# Patient Record
Sex: FEMALE | Race: WHITE | NOT HISPANIC OR LATINO | Employment: FULL TIME | URBAN - NONMETROPOLITAN AREA
[De-identification: names, ages, dates, MRNs, and addresses within clinical notes are randomized per-mention and may not be internally consistent; named-entity substitution may affect disease eponyms.]

---

## 2017-01-30 ENCOUNTER — OFFICE VISIT (OUTPATIENT)
Dept: RETAIL CLINIC | Facility: CLINIC | Age: 32
End: 2017-01-30

## 2017-01-30 VITALS
HEART RATE: 89 BPM | WEIGHT: 108 LBS | RESPIRATION RATE: 20 BRPM | BODY MASS INDEX: 19.88 KG/M2 | DIASTOLIC BLOOD PRESSURE: 50 MMHG | SYSTOLIC BLOOD PRESSURE: 100 MMHG | HEIGHT: 62 IN | OXYGEN SATURATION: 99 % | TEMPERATURE: 98.7 F

## 2017-01-30 DIAGNOSIS — J10.1 INFLUENZA A: Primary | ICD-10-CM

## 2017-01-30 LAB
EXPIRATION DATE: ABNORMAL
FLUAV AG NPH QL: ABNORMAL
FLUBV AG NPH QL: ABNORMAL
INTERNAL CONTROL: ABNORMAL
Lab: ABNORMAL

## 2017-01-30 PROCEDURE — 99213 OFFICE O/P EST LOW 20 MIN: CPT | Performed by: NURSE PRACTITIONER

## 2017-01-30 PROCEDURE — 87804 INFLUENZA ASSAY W/OPTIC: CPT | Performed by: NURSE PRACTITIONER

## 2017-01-30 RX ORDER — OSELTAMIVIR PHOSPHATE 75 MG/1
75 CAPSULE ORAL 2 TIMES DAILY
Qty: 10 CAPSULE | Refills: 0 | Status: SHIPPED | OUTPATIENT
Start: 2017-01-30 | End: 2018-10-22

## 2017-01-30 NOTE — PROGRESS NOTES
Subjective   Kym Pepe is a 31 y.o. female who presents to the clinic with: fever and cough      Fever    This is a new problem. The current episode started yesterday. The problem occurs intermittently. The problem has been gradually worsening. The maximum temperature noted was 100 to 100.9 F. Temperature source: infrared temp. Associated symptoms include coughing, muscle aches and sleepiness. Pertinent negatives include no abdominal pain, chest pain, congestion, diarrhea, ear pain, headaches, nausea, rash, sore throat, urinary pain, vomiting or wheezing. Associated symptoms comments: Shortness of breath;Sneezing. She has tried NSAIDs for the symptoms. Improvement on treatment: temp went down and myalgias got better.   Risk factors: sick contacts    Risk factors: no recent sickness    Cough   This is a new problem. The current episode started yesterday. The problem has been unchanged. The problem occurs constantly. The cough is non-productive. Associated symptoms include ear congestion, a fever, myalgias, nasal congestion, postnasal drip, rhinorrhea and shortness of breath. Pertinent negatives include no chest pain, chills, ear pain, headaches, heartburn, hemoptysis, rash, sore throat, sweats, weight loss or wheezing. Nothing aggravates the symptoms. She has tried OTC cough suppressant for the symptoms. The treatment provided mild relief. Her past medical history is significant for environmental allergies. There is no history of asthma, bronchitis or pneumonia.        The following portions of the patient's history were reviewed and updated as appropriate: allergies, current medications, past family history, past medical history, past social history, past surgical history and problem list.        Review of Systems   Constitutional: Positive for fever. Negative for chills, diaphoresis and weight loss.   HENT: Positive for postnasal drip and rhinorrhea. Negative for congestion, ear pain and sore throat.     Respiratory: Positive for cough and shortness of breath. Negative for hemoptysis and wheezing.    Cardiovascular: Negative for chest pain.   Gastrointestinal: Negative for abdominal pain, diarrhea, heartburn, nausea and vomiting.   Genitourinary: Negative for dysuria.   Musculoskeletal: Positive for myalgias.   Skin: Negative for rash.   Allergic/Immunologic: Positive for environmental allergies.   Neurological: Negative for headaches.   All other systems reviewed and are negative.        Objective   Physical Exam   Constitutional: She is oriented to person, place, and time. Vital signs are normal. She appears well-developed and well-nourished.   HENT:   Head: Normocephalic and atraumatic.   Right Ear: Hearing, external ear and ear canal normal.   Left Ear: Hearing, external ear and ear canal normal.   Nose: Nose normal. Right sinus exhibits no maxillary sinus tenderness and no frontal sinus tenderness. Left sinus exhibits no maxillary sinus tenderness and no frontal sinus tenderness.   Mouth/Throat: Uvula is midline and oropharynx is clear and moist.   Rick tms dull and cloudy with scattered light reflex   Eyes: Conjunctivae and lids are normal. Pupils are equal, round, and reactive to light.   Neck: Neck supple. No tracheal deviation present. No thyromegaly present.   Cardiovascular: Normal rate, regular rhythm, S1 normal, S2 normal and normal heart sounds.  Exam reveals no gallop, no S3, no S4 and no friction rub.    No murmur heard.  Pulmonary/Chest: Effort normal. She has decreased breath sounds.   Breath sounds decreased    Lymphadenopathy:     She has no cervical adenopathy.   Neurological: She is alert and oriented to person, place, and time.   Skin: Skin is warm, dry and intact.   Psychiatric: She has a normal mood and affect. Her behavior is normal.   Vitals reviewed.        Assessment/Plan   Kym was seen today for fever and cough.    Diagnoses and all orders for this visit:    Influenza A  -      oseltamivir (TAMIFLU) 75 MG capsule; Take 1 capsule by mouth 2 (Two) Times a Day.  -     POC Influenza A / B

## 2017-01-30 NOTE — PATIENT INSTRUCTIONS
Pt advised she has influenza a.  Tamiflu script for her and her family.  Rest, drink plenty of fluids, off work for 5 days and until fever free. Treat s/s with otc meds.  If she develops severe shortness, of breath chest pain, fever uncontrolled or cough go to ER.

## 2017-02-06 ENCOUNTER — TELEPHONE (OUTPATIENT)
Dept: RETAIL CLINIC | Facility: CLINIC | Age: 32
End: 2017-02-06

## 2017-02-06 NOTE — TELEPHONE ENCOUNTER
I advised in light of testing positive for flu and just coming off tamiflu she could have a complication from the flu.  She needs to go to ER for evaluation.  Pt verbalized understanding.

## 2017-10-26 ENCOUNTER — OFFICE VISIT (OUTPATIENT)
Dept: RETAIL CLINIC | Facility: CLINIC | Age: 32
End: 2017-10-26

## 2017-10-26 VITALS — TEMPERATURE: 98.2 F

## 2017-10-26 DIAGNOSIS — Z23 NEED FOR IMMUNIZATION AGAINST INFLUENZA: Primary | ICD-10-CM

## 2017-10-26 NOTE — PROGRESS NOTES
Kym Pepe is a 32 y.o. female who presents to the clinic for a flu shot. No fever or illness today. No contraindications for flu vaccine. Kym filled out questionnaire and signed consent.    History of Present Illness No illness today.  Flu shot only.    The following portions of the patient's history were reviewed and updated as appropriate: allergies, current medications, past family history, past medical history, past social history, past surgical history and problem list.        Review of Systems   All other systems reviewed and are negative.        Objective   Physical Exam   Constitutional: She is oriented to person, place, and time. She appears well-developed and well-nourished.   HENT:   Head: Normocephalic and atraumatic.   Eyes: Pupils are equal, round, and reactive to light.   Neck: Neck supple.   Neurological: She is alert and oriented to person, place, and time.   Skin: Skin is warm, dry and intact.   Psychiatric: She has a normal mood and affect. Her behavior is normal. Judgment and thought content normal.         Assessment/Plan   Influenza vaccination        Forms completed. No restrictions. See immunizations and vaxcare forms.  Fluzone given.  Information given on flu shot. Follow up yrly.     Follow up as needed

## 2017-10-26 NOTE — PATIENT INSTRUCTIONS
Forms completed. No restrictions. See immunizations and vaxcare forms.  Fluzone given.  Information given on flu shot. Follow up yrly.

## 2018-10-22 ENCOUNTER — OFFICE VISIT (OUTPATIENT)
Dept: RETAIL CLINIC | Facility: CLINIC | Age: 33
End: 2018-10-22

## 2018-10-22 VITALS
DIASTOLIC BLOOD PRESSURE: 70 MMHG | TEMPERATURE: 98 F | RESPIRATION RATE: 20 BRPM | HEART RATE: 89 BPM | WEIGHT: 108 LBS | OXYGEN SATURATION: 97 % | HEIGHT: 62 IN | BODY MASS INDEX: 19.88 KG/M2 | SYSTOLIC BLOOD PRESSURE: 110 MMHG

## 2018-10-22 DIAGNOSIS — M43.6 TORTICOLLIS, ACUTE: Primary | ICD-10-CM

## 2018-10-22 PROCEDURE — 99213 OFFICE O/P EST LOW 20 MIN: CPT | Performed by: NURSE PRACTITIONER

## 2018-10-22 PROCEDURE — 96372 THER/PROPH/DIAG INJ SC/IM: CPT | Performed by: NURSE PRACTITIONER

## 2018-10-22 RX ORDER — PREDNISONE 20 MG/1
20 TABLET ORAL DAILY
Qty: 20 TABLET | Refills: 0 | OUTPATIENT
Start: 2018-10-22 | End: 2019-01-15 | Stop reason: HOSPADM

## 2018-10-22 RX ORDER — DEXAMETHASONE SODIUM PHOSPHATE 4 MG/ML
8 INJECTION, SOLUTION INTRA-ARTICULAR; INTRALESIONAL; INTRAMUSCULAR; INTRAVENOUS; SOFT TISSUE ONCE
Status: COMPLETED | OUTPATIENT
Start: 2018-10-22 | End: 2018-10-22

## 2018-10-22 RX ADMIN — DEXAMETHASONE SODIUM PHOSPHATE 8 MG: 4 INJECTION, SOLUTION INTRA-ARTICULAR; INTRALESIONAL; INTRAMUSCULAR; INTRAVENOUS; SOFT TISSUE at 10:10

## 2018-10-22 NOTE — PROGRESS NOTES
Subjective   Kym Pepe is a 33 y.o. female who presents to the clinic with: back pain      Back Pain   This is a new problem. The current episode started today. The problem occurs constantly. The problem is unchanged. The pain is present in the thoracic spine. The quality of the pain is described as aching, shooting and stabbing. Radiates to: radiates to neck. The pain is at a severity of 7/10. The pain is moderate. The pain is the same all the time. The symptoms are aggravated by twisting, position and bending. Stiffness is present all day. Associated symptoms include headaches. Pertinent negatives include no fever, numbness, paresis, paresthesias, tingling or weakness. She has tried NSAIDs (icy hot patches) for the symptoms. The treatment provided mild relief.        The following portions of the patient's history were reviewed and updated as appropriate: allergies, current medications, past family history, past medical history, past social history, past surgical history and problem list.        Review of Systems   Constitutional: Negative for fever.   Musculoskeletal: Positive for back pain and neck pain. Negative for neck stiffness.   Neurological: Positive for headaches. Negative for tingling, weakness, numbness and paresthesias.         Objective   Physical Exam   Constitutional: She is oriented to person, place, and time. Vital signs are normal. She appears well-developed and well-nourished.   HENT:   Head: Normocephalic and atraumatic.   Eyes: Pupils are equal, round, and reactive to light. Conjunctivae are normal.   Neck: Neck supple. No tracheal deviation present.   Neck range of motion limited related to pain in neck and thoraic spine.  Pt can flex but not extend or laterally bend neck to the left or right   Musculoskeletal: She exhibits tenderness. She exhibits no edema or deformity.        Cervical back: She exhibits decreased range of motion, tenderness and pain. She exhibits no bony tenderness,  no swelling, no edema, no deformity, no laceration, no spasm and normal pulse.        Thoracic back: She exhibits decreased range of motion, tenderness and pain. She exhibits no bony tenderness, no swelling, no edema, no deformity, no laceration, no spasm and normal pulse.   Pt denies pain with palpation of upper cervical but does have some pain low cervical and upper thoraic spine but no pain thoraic or lumbar spine.  Pt is able to internally and externally  rotate the arms behind head and back but with some limit and pain.  Pt can not raise area over head completely approximately 1/2 way.     Lymphadenopathy:     She has no cervical adenopathy.   Neurological: She is alert and oriented to person, place, and time. She has normal strength. No sensory deficit. Coordination and gait normal.   Reflex Scores:       Bicep reflexes are 2+ on the right side and 2+ on the left side.       Brachioradialis reflexes are 2+ on the right side and 2+ on the left side.       Patellar reflexes are 2+ on the right side and 2+ on the left side.  Skin: Skin is warm, dry and intact.   Psychiatric: She has a normal mood and affect. Her speech is normal and behavior is normal. Judgment and thought content normal.   Vitals reviewed.        Assessment/Plan   Kym was seen today for back pain.    Diagnoses and all orders for this visit:    Torticollis, acute    Other orders  -     predniSONE (DELTASONE) 20 MG tablet; Take 1 tablet by mouth Daily. 1 tid x 3 days the 1 bid x 3 days then 1 dly x 3 days then 1/2 dly x 4 days stop          Pt advised she seems to have a torticollis.  Continue icy patch and Ibuprofen today.  Stop ibuprofen in am and start Prednisone. After prednisone she can resume the ibuprofen 600 every 6 hrs for 2 wks.  Ice and heat alternating 3 to 4 times a day.  If she worsens or s/s change with headache, fever, weakness of arms go to ER or if you do not improve in 2 days follow up with pcp

## 2018-10-22 NOTE — PATIENT INSTRUCTIONS
Pt advised she seems to have a torticollis.  Continue icy patch and Ibuprofen today.  Stop ibuprofen in am and start Prednisone. After prednisone she can resume the ibuprofen 600 every 6 hrs for 2 wks.  Ice and heat alternating 3 to 4 times a day.  If she worsens or s/s change with headache, fever, weakness of arms go to ER or if you do not improve in 2 days follow up with pcp

## 2019-01-14 ENCOUNTER — TRANSCRIBE ORDERS (OUTPATIENT)
Dept: ADMINISTRATIVE | Facility: HOSPITAL | Age: 34
End: 2019-01-14

## 2019-01-14 DIAGNOSIS — R31.9 HEMATURIA, UNSPECIFIED TYPE: Primary | ICD-10-CM

## 2019-01-15 ENCOUNTER — APPOINTMENT (OUTPATIENT)
Dept: CT IMAGING | Facility: HOSPITAL | Age: 34
End: 2019-01-15

## 2019-01-15 ENCOUNTER — HOSPITAL ENCOUNTER (EMERGENCY)
Facility: HOSPITAL | Age: 34
Discharge: HOME | End: 2019-01-15
Admitting: EMERGENCY MEDICINE

## 2019-01-15 VITALS
HEIGHT: 62 IN | DIASTOLIC BLOOD PRESSURE: 71 MMHG | TEMPERATURE: 100 F | HEART RATE: 92 BPM | SYSTOLIC BLOOD PRESSURE: 105 MMHG | OXYGEN SATURATION: 100 % | RESPIRATION RATE: 16 BRPM | BODY MASS INDEX: 21.53 KG/M2 | WEIGHT: 117 LBS

## 2019-01-15 DIAGNOSIS — N20.0 KIDNEY STONE ON LEFT SIDE: Primary | ICD-10-CM

## 2019-01-15 DIAGNOSIS — N83.201 RIGHT OVARIAN CYST: ICD-10-CM

## 2019-01-15 LAB
ALBUMIN SERPL-MCNC: 4.3 G/DL (ref 3.5–5)
ALBUMIN/GLOB SERPL: 1.5 G/DL (ref 1.1–2.5)
ALP SERPL-CCNC: 44 U/L (ref 24–120)
ALT SERPL W P-5'-P-CCNC: 25 U/L (ref 0–54)
AMYLASE SERPL-CCNC: 79 U/L (ref 30–110)
ANION GAP SERPL CALCULATED.3IONS-SCNC: 10 MMOL/L (ref 4–13)
AST SERPL-CCNC: 23 U/L (ref 7–45)
B-HCG UR QL: NEGATIVE
BACTERIA UR QL AUTO: ABNORMAL /HPF
BASOPHILS # BLD AUTO: 0.05 10*3/MM3 (ref 0–0.2)
BASOPHILS NFR BLD AUTO: 0.5 % (ref 0–2)
BILIRUB SERPL-MCNC: 0.6 MG/DL (ref 0.1–1)
BILIRUB UR QL STRIP: NEGATIVE
BUN BLD-MCNC: 14 MG/DL (ref 5–21)
BUN/CREAT SERPL: 15.6 (ref 7–25)
CALCIUM SPEC-SCNC: 8.8 MG/DL (ref 8.4–10.4)
CHLORIDE SERPL-SCNC: 102 MMOL/L (ref 98–110)
CLARITY UR: ABNORMAL
CO2 SERPL-SCNC: 28 MMOL/L (ref 24–31)
COLOR UR: YELLOW
CREAT BLD-MCNC: 0.9 MG/DL (ref 0.5–1.4)
DEPRECATED RDW RBC AUTO: 36.3 FL (ref 40–54)
EOSINOPHIL # BLD AUTO: 0.06 10*3/MM3 (ref 0–0.7)
EOSINOPHIL NFR BLD AUTO: 0.7 % (ref 0–4)
ERYTHROCYTE [DISTWIDTH] IN BLOOD BY AUTOMATED COUNT: 11.5 % (ref 12–15)
GFR SERPL CREATININE-BSD FRML MDRD: 72 ML/MIN/1.73
GLOBULIN UR ELPH-MCNC: 2.8 GM/DL
GLUCOSE BLD-MCNC: 129 MG/DL (ref 70–100)
GLUCOSE UR STRIP-MCNC: NEGATIVE MG/DL
HCT VFR BLD AUTO: 39.3 % (ref 37–47)
HGB BLD-MCNC: 13.9 G/DL (ref 12–16)
HGB UR QL STRIP.AUTO: ABNORMAL
HOLD SPECIMEN: NORMAL
HOLD SPECIMEN: NORMAL
IMM GRANULOCYTES # BLD AUTO: 0.04 10*3/MM3 (ref 0–0.03)
IMM GRANULOCYTES NFR BLD AUTO: 0.4 % (ref 0–5)
KETONES UR QL STRIP: NEGATIVE
LEUKOCYTE ESTERASE UR QL STRIP.AUTO: ABNORMAL
LIPASE SERPL-CCNC: 69 U/L (ref 23–203)
LYMPHOCYTES # BLD AUTO: 1.67 10*3/MM3 (ref 0.72–4.86)
LYMPHOCYTES NFR BLD AUTO: 18.2 % (ref 15–45)
MCH RBC QN AUTO: 30.8 PG (ref 28–32)
MCHC RBC AUTO-ENTMCNC: 35.4 G/DL (ref 33–36)
MCV RBC AUTO: 86.9 FL (ref 82–98)
MONOCYTES # BLD AUTO: 0.37 10*3/MM3 (ref 0.19–1.3)
MONOCYTES NFR BLD AUTO: 4 % (ref 4–12)
NEUTROPHILS # BLD AUTO: 7.01 10*3/MM3 (ref 1.87–8.4)
NEUTROPHILS NFR BLD AUTO: 76.2 % (ref 39–78)
NITRITE UR QL STRIP: NEGATIVE
NRBC BLD AUTO-RTO: 0 /100 WBC (ref 0–0)
PH UR STRIP.AUTO: 7.5 [PH] (ref 5–8)
PLATELET # BLD AUTO: 187 10*3/MM3 (ref 130–400)
PMV BLD AUTO: 10.7 FL (ref 6–12)
POTASSIUM BLD-SCNC: 4.2 MMOL/L (ref 3.5–5.3)
PROT SERPL-MCNC: 7.1 G/DL (ref 6.3–8.7)
PROT UR QL STRIP: ABNORMAL
RBC # BLD AUTO: 4.52 10*6/MM3 (ref 4.2–5.4)
RBC # UR: ABNORMAL /HPF
REF LAB TEST METHOD: ABNORMAL
SODIUM BLD-SCNC: 140 MMOL/L (ref 135–145)
SP GR UR STRIP: 1.03 (ref 1–1.03)
SQUAMOUS #/AREA URNS HPF: ABNORMAL /HPF
UROBILINOGEN UR QL STRIP: ABNORMAL
WBC NRBC COR # BLD: 9.2 10*3/MM3 (ref 4.8–10.8)
WBC UR QL AUTO: ABNORMAL /HPF
WHOLE BLOOD HOLD SPECIMEN: NORMAL
WHOLE BLOOD HOLD SPECIMEN: NORMAL

## 2019-01-15 PROCEDURE — 82150 ASSAY OF AMYLASE: CPT | Performed by: EMERGENCY MEDICINE

## 2019-01-15 PROCEDURE — 74177 CT ABD & PELVIS W/CONTRAST: CPT

## 2019-01-15 PROCEDURE — 87086 URINE CULTURE/COLONY COUNT: CPT | Performed by: EMERGENCY MEDICINE

## 2019-01-15 PROCEDURE — 25010000002 HYDROMORPHONE PER 4 MG: Performed by: EMERGENCY MEDICINE

## 2019-01-15 PROCEDURE — 25010000002 MORPHINE PER 10 MG: Performed by: EMERGENCY MEDICINE

## 2019-01-15 PROCEDURE — 96375 TX/PRO/DX INJ NEW DRUG ADDON: CPT

## 2019-01-15 PROCEDURE — 25010000002 PROMETHAZINE PER 50 MG: Performed by: EMERGENCY MEDICINE

## 2019-01-15 PROCEDURE — 81001 URINALYSIS AUTO W/SCOPE: CPT | Performed by: EMERGENCY MEDICINE

## 2019-01-15 PROCEDURE — 96374 THER/PROPH/DIAG INJ IV PUSH: CPT

## 2019-01-15 PROCEDURE — 36415 COLL VENOUS BLD VENIPUNCTURE: CPT | Performed by: PHYSICIAN ASSISTANT

## 2019-01-15 PROCEDURE — 0 IOPAMIDOL PER 1 ML: Performed by: PHYSICIAN ASSISTANT

## 2019-01-15 PROCEDURE — 85025 COMPLETE CBC W/AUTO DIFF WBC: CPT | Performed by: EMERGENCY MEDICINE

## 2019-01-15 PROCEDURE — 25010000002 KETOROLAC TROMETHAMINE PER 15 MG: Performed by: EMERGENCY MEDICINE

## 2019-01-15 PROCEDURE — 99283 EMERGENCY DEPT VISIT LOW MDM: CPT

## 2019-01-15 PROCEDURE — 87040 BLOOD CULTURE FOR BACTERIA: CPT | Performed by: EMERGENCY MEDICINE

## 2019-01-15 PROCEDURE — 25010000002 ONDANSETRON PER 1 MG: Performed by: EMERGENCY MEDICINE

## 2019-01-15 PROCEDURE — 81025 URINE PREGNANCY TEST: CPT | Performed by: EMERGENCY MEDICINE

## 2019-01-15 PROCEDURE — 96361 HYDRATE IV INFUSION ADD-ON: CPT

## 2019-01-15 PROCEDURE — 83690 ASSAY OF LIPASE: CPT | Performed by: EMERGENCY MEDICINE

## 2019-01-15 PROCEDURE — 80053 COMPREHEN METABOLIC PANEL: CPT | Performed by: EMERGENCY MEDICINE

## 2019-01-15 RX ORDER — PROMETHAZINE HYDROCHLORIDE 25 MG/ML
12.5 INJECTION, SOLUTION INTRAMUSCULAR; INTRAVENOUS ONCE
Status: COMPLETED | OUTPATIENT
Start: 2019-01-15 | End: 2019-01-15

## 2019-01-15 RX ORDER — ONDANSETRON 4 MG/1
4 TABLET, FILM COATED ORAL EVERY 6 HOURS PRN
Qty: 12 TABLET | Refills: 0 | Status: SHIPPED | OUTPATIENT
Start: 2019-01-15 | End: 2020-02-03

## 2019-01-15 RX ORDER — KETOROLAC TROMETHAMINE 30 MG/ML
30 INJECTION, SOLUTION INTRAMUSCULAR; INTRAVENOUS ONCE
Status: COMPLETED | OUTPATIENT
Start: 2019-01-15 | End: 2019-01-15

## 2019-01-15 RX ORDER — SODIUM CHLORIDE 9 MG/ML
125 INJECTION, SOLUTION INTRAVENOUS CONTINUOUS
Status: DISCONTINUED | OUTPATIENT
Start: 2019-01-15 | End: 2019-01-15 | Stop reason: HOSPADM

## 2019-01-15 RX ORDER — ONDANSETRON 2 MG/ML
4 INJECTION INTRAMUSCULAR; INTRAVENOUS ONCE
Status: COMPLETED | OUTPATIENT
Start: 2019-01-15 | End: 2019-01-15

## 2019-01-15 RX ORDER — HYDROMORPHONE HYDROCHLORIDE 1 MG/ML
0.5 INJECTION, SOLUTION INTRAMUSCULAR; INTRAVENOUS; SUBCUTANEOUS ONCE
Status: COMPLETED | OUTPATIENT
Start: 2019-01-15 | End: 2019-01-15

## 2019-01-15 RX ORDER — KETOROLAC TROMETHAMINE 10 MG/1
10 TABLET, FILM COATED ORAL EVERY 6 HOURS PRN
Qty: 9 TABLET | Refills: 0 | Status: SHIPPED | OUTPATIENT
Start: 2019-01-15 | End: 2020-02-03

## 2019-01-15 RX ORDER — HYDROCODONE BITARTRATE AND ACETAMINOPHEN 7.5; 325 MG/1; MG/1
1 TABLET ORAL EVERY 4 HOURS PRN
Qty: 12 TABLET | Refills: 0 | Status: SHIPPED | OUTPATIENT
Start: 2019-01-15 | End: 2020-02-03

## 2019-01-15 RX ADMIN — MORPHINE SULFATE 4 MG: 4 INJECTION, SOLUTION INTRAMUSCULAR; INTRAVENOUS at 10:45

## 2019-01-15 RX ADMIN — SODIUM CHLORIDE 1593 ML: 9 INJECTION, SOLUTION INTRAVENOUS at 10:38

## 2019-01-15 RX ADMIN — SODIUM CHLORIDE 125 ML/HR: 9 INJECTION, SOLUTION INTRAVENOUS at 12:15

## 2019-01-15 RX ADMIN — IOPAMIDOL 75 ML: 755 INJECTION, SOLUTION INTRAVENOUS at 11:32

## 2019-01-15 RX ADMIN — KETOROLAC TROMETHAMINE 30 MG: 30 INJECTION, SOLUTION INTRAMUSCULAR at 10:45

## 2019-01-15 RX ADMIN — PROMETHAZINE HYDROCHLORIDE 12.5 MG: 25 INJECTION INTRAMUSCULAR; INTRAVENOUS at 11:51

## 2019-01-15 RX ADMIN — HYDROMORPHONE HYDROCHLORIDE 0.5 MG: 1 INJECTION, SOLUTION INTRAMUSCULAR; INTRAVENOUS; SUBCUTANEOUS at 11:51

## 2019-01-15 RX ADMIN — ONDANSETRON 4 MG: 2 SOLUTION INTRAMUSCULAR; INTRAVENOUS at 10:45

## 2019-01-15 NOTE — ED PROVIDER NOTES
Subjective     Flank Pain   Associated symptoms: no chills, no constipation, no diarrhea, no fatigue, no fever, no nausea and no vomiting      Patient is a pleasant 33-year-old female with chief complaint of left flank pain.  The patient describes about 10 days ago, she noted some dysuria, urinary frequency, some hesitancy, and left flank discomfort.  She thought she is developing urinary tract infection.  She went to see her primary care provider and completed a urinalysis with culture.  She was prescribed Bactrim for the following 3 days.  Her symptoms improved.  She reports her culture did grow Escherichia coli.  However, her symptoms are to recur and become more intense.  She had a urinalysis completed a week ago.  She reports there was no growth in the hand but was placed on Keflex.  Her pain has escalated and become more localized to left flank region.  It started to migrate to the left lateral abdominal area.  She notes that there were crystals in her urinalysis. She was started on Keflex. Certainly 8 o'clock this morning, she began to vomit.  She vomited at least 10 times.  She had a blood in her emesis.  She denies changes in her bowel movements.  She is complaining of urinary hesitancy now.  She is to urinate and only dribbles out urine.  She does have a desire to urinate.  She denies any associated fever, body aches, or chills.  She has a history of pyelonephritis about 5 years ago that presented differently.  She denies any previous history kidney stones.  She denies any rash at this site.  She denies any respiratory symptoms.  She denies any trauma.  She denies any vaginal bleeding or discharge.     Review of Systems   Constitutional: Negative for activity change, appetite change, chills, fatigue and fever.   HENT: Negative.    Respiratory: Negative.    Cardiovascular: Negative.    Gastrointestinal: Negative.  Negative for constipation, diarrhea, nausea and vomiting.   Genitourinary: Positive for flank  pain.   Skin: Negative.    Neurological: Negative.    Psychiatric/Behavioral: Negative.        Past Medical History:   Diagnosis Date   • Meningitis due to viruses 2012       No Known Allergies    Past Surgical History:   Procedure Laterality Date   • BREAST SURGERY      antonio breast augmentation 2005       Family History   Problem Relation Age of Onset   • Lung disease Mother    • Heart disease Father    • Obesity Father    • Lung disease Father    • Hypertension Father    • Cancer Other    • Diabetes Other    • Heart disease Other    • Obesity Other        Social History     Socioeconomic History   • Marital status:      Spouse name: Not on file   • Number of children: Not on file   • Years of education: Not on file   • Highest education level: Not on file   Occupational History     Comment: School nurse [employer]   Tobacco Use   • Smoking status: Never Smoker   • Smokeless tobacco: Never Used   Substance and Sexual Activity   • Alcohol use: No   • Drug use: No   • Sexual activity: Yes     Birth control/protection: None   Social History Narrative    Pt  with one child.  No second hand smoke exposure.        Prior to Admission medications    Medication Sig Start Date End Date Taking? Authorizing Provider   IBUPROFEN PO Take  by mouth. 600mg this am    ProviderElian MD   levonorgestrel (MIRENA, 52 MG,) 20 MCG/24HR IUD Mirena 20 mcg/24 hr (5 years) intrauterine device   Take by intrauterine route.    Provider, MD Elian   loratadine-pseudoephedrine (CLARITIN-D 24-hour)  MG per 24 hr tablet Take 1 tablet by mouth Daily. 10/17/16   Lin Mercer APRN   Menthol, Topical Analgesic, (ICY HOT BACK EX) Apply  topically.    ProviderElian MD   predniSONE (DELTASONE) 20 MG tablet Take 1 tablet by mouth Daily. 1 tid x 3 days the 1 bid x 3 days then 1 dly x 3 days then 1/2 dly x 4 days stop 10/22/18   Lin Mercer APRN       Medications   Sodium chloride 0.9 % infusion  "(125 mL/hr Intravenous New Bag 1/15/19 1215)   sodium chloride 0.9 % bolus 1,593 mL (0 mL/kg × 53.1 kg Intravenous Stopped 1/15/19 1145)   ondansetron (ZOFRAN) injection 4 mg (4 mg Intravenous Given 1/15/19 1045)   morphine injection 4 mg (4 mg Intravenous Given 1/15/19 1045)   ketorolac (TORADOL) injection 30 mg (30 mg Intravenous Given 1/15/19 1045)   iopamidol (ISOVUE-370) 76 % injection 200 mL (75 mL Intravenous Given 1/15/19 1132)   HYDROmorphone (DILAUDID) injection 0.5 mg (0.5 mg Intravenous Given 1/15/19 1151)   promethazine (PHENERGAN) injection 12.5 mg (12.5 mg Intravenous Given 1/15/19 1151)       /88 (BP Location: Right arm, Patient Position: Sitting)   Pulse 69   Temp 98.1 °F (36.7 °C) (Temporal)   Resp 18   Ht 157.5 cm (62\")   Wt 53.1 kg (117 lb)   SpO2 100%   BMI 21.40 kg/m²       Objective   Physical Exam   Constitutional: She is oriented to person, place, and time. She appears well-developed and well-nourished. No distress.   HENT:   Head: Normocephalic and atraumatic.   Eyes: Conjunctivae and EOM are normal. Pupils are equal, round, and reactive to light.   Neck: Normal range of motion. Neck supple. No tracheal deviation present.   Cardiovascular: Normal rate, regular rhythm, normal heart sounds and intact distal pulses.   No murmur heard.  Pulmonary/Chest: Effort normal and breath sounds normal. No stridor. She has no rales.   Abdominal: Soft. Bowel sounds are normal. She exhibits no distension and no mass. There is generalized tenderness and tenderness in the left upper quadrant and left lower quadrant. There is CVA tenderness. There is no rebound and no guarding.   Musculoskeletal: Normal range of motion. She exhibits no edema.   Neurological: She is alert and oriented to person, place, and time. She has normal reflexes.   Skin: Skin is warm and dry. She is not diaphoretic.   Psychiatric: She has a normal mood and affect. Her behavior is normal. Judgment and thought content normal. "   Nursing note and vitals reviewed.      Procedures         Lab Results (last 24 hours)     Procedure Component Value Units Date/Time    CBC & Differential [[id number]] Collected:  01/15/19 1011    Specimen:  Blood Updated:  01/15/19 1030    Narrative:       The following orders were created for panel order CBC & Differential.  Procedure                               Abnormality         Status                     ---------                               -----------         ------                     CBC Auto Differential[[id number]]        Abnormal            Final result                 Please view results for these tests on the individual orders.    Lipase [[id number]]  (Normal) Collected:  01/15/19 1011    Specimen:  Blood from Arm, Left Updated:  01/15/19 1044     Lipase 69 U/L     Amylase [[id number]]  (Normal) Collected:  01/15/19 1011    Specimen:  Blood from Arm, Left Updated:  01/15/19 1044     Amylase 79 U/L     Comprehensive Metabolic Panel [[id number]]  (Abnormal) Collected:  01/15/19 1011    Specimen:  Blood from Arm, Left Updated:  01/15/19 1044     Glucose 129 mg/dL      BUN 14 mg/dL      Creatinine 0.90 mg/dL      Sodium 140 mmol/L      Potassium 4.2 mmol/L      Chloride 102 mmol/L      CO2 28.0 mmol/L      Calcium 8.8 mg/dL      Total Protein 7.1 g/dL      Albumin 4.30 g/dL      ALT (SGPT) 25 U/L      AST (SGOT) 23 U/L      Alkaline Phosphatase 44 U/L      Total Bilirubin 0.6 mg/dL      eGFR Non African Amer 72 mL/min/1.73      Globulin 2.8 gm/dL      A/G Ratio 1.5 g/dL      BUN/Creatinine Ratio 15.6     Anion Gap 10.0 mmol/L     CBC Auto Differential [[id number]]  (Abnormal) Collected:  01/15/19 1011    Specimen:  Blood from Arm, Left Updated:  01/15/19 1030     WBC 9.20 10*3/mm3      RBC 4.52 10*6/mm3      Hemoglobin 13.9 g/dL      Hematocrit 39.3 %      MCV 86.9 fL      MCH 30.8 pg      MCHC 35.4 g/dL      RDW 11.5 %      RDW-SD 36.3 fl      MPV 10.7 fL      Platelets 187 10*3/mm3      Neutrophil % 76.2  %      Lymphocyte % 18.2 %      Monocyte % 4.0 %      Eosinophil % 0.7 %      Basophil % 0.5 %      Immature Grans % 0.4 %      Neutrophils, Absolute 7.01 10*3/mm3      Lymphocytes, Absolute 1.67 10*3/mm3      Monocytes, Absolute 0.37 10*3/mm3      Eosinophils, Absolute 0.06 10*3/mm3      Basophils, Absolute 0.05 10*3/mm3      Immature Grans, Absolute 0.04 10*3/mm3      nRBC 0.0 /100 WBC     Urinalysis With Culture If Indicated - Urine, Catheter [[id number]]  (Abnormal) Collected:  01/15/19 1033    Specimen:  Urine, Catheter Updated:  01/15/19 1057     Color, UA Yellow     Appearance, UA Cloudy     pH, UA 7.5     Specific Gravity, UA 1.026     Glucose, UA Negative     Ketones, UA Negative     Bilirubin, UA Negative     Blood, UA Large (3+)     Protein, UA 30 mg/dL (1+)     Leuk Esterase, UA Small (1+)     Nitrite, UA Negative     Urobilinogen, UA 1.0 E.U./dL    Urine Culture - Urine, Urine, Catheter [[id number]] Collected:  01/15/19 1033    Specimen:  Urine, Catheter Updated:  01/15/19 1044    Urinalysis, Microscopic Only - Urine, Catheter [[id number]]  (Abnormal) Collected:  01/15/19 1033    Specimen:  Urine, Catheter Updated:  01/15/19 1102     RBC, UA Too Numerous to Count /HPF      WBC, UA 0-2 /HPF      Bacteria, UA None Seen /HPF      Squamous Epithelial Cells, UA 0-2 /HPF      Methodology Manual Light Microscopy    Narrative:       Only 3 ml specimen sent.  Microscopics performed on uncentrifuged specimen.    Pregnancy, Urine - Urine, Clean Catch [[id number]]  (Normal) Collected:  01/15/19 1034    Specimen:  Urine, Clean Catch Updated:  01/15/19 1057     HCG, Urine QL Negative    Blood Culture With JACOBY - Blood, Arm, Left [[id number]] Collected:  01/15/19 1034    Specimen:  Blood from Arm, Left Updated:  01/15/19 1055          Ct Abdomen Pelvis With Contrast    Result Date: 1/15/2019  Narrative: CT ABDOMEN AND PELVIS WITH CONTRAST 1/15/2019 11:29 AM CST  HISTORY: Left flank pain  COMPARISON: None.  DLP: 230 mGy cm   TECHNIQUE: Following the intravenous administration of contrast, helical CT tomographic images of the abdomen and pelvis were acquired. Coronal reformatted images were also provided for review.  FINDINGS: The lung bases and base of the heart are unremarkable.  LIVER: A small cyst is noted in the liver on image 12..  BILIARY SYSTEM: The gallbladder is unremarkable. No intrahepatic or extrahepatic ductal dilatation.  PANCREAS: No focal pancreatic lesion.  SPLEEN: Unremarkable.  KIDNEYS AND ADRENALS: The adrenal glands are visualized. The renal contours demonstrate a slightly asymmetric nephrogram.. Hydronephrosis is noted on the left. A left obstructive uropathy is present with a 5 mm calculus in the distal left ureter at the left ureterovesical junction..  RETROPERITONEUM: No mass, lymphadenopathy or hemorrhage.  GI TRACT: No evidence of obstruction or bowel wall thickening.    .  OTHER: There is no mesenteric mass, lymphadenopathy or fluid collection. The abdominopelvic vasculature is patent. The osseous structures and soft tissues demonstrate no worrisome lesions.      PELVIS: Uterus is visualized. Intrauterine device is present. There is a small 10 mm right ovarian cyst.. The urinary bladder is normal in appearance.      Impression: 1. Left obstructive uropathy with 5 mm calculus distal left ureter at the left ureterovesical junction..   This report was finalized on 01/15/2019 11:55 by Dr. Kalpesh Sheehan MD.      ED Course        Patient has been reassessed.  She reports feeling much better after the pain medications administered.  I have educated her on test results and CT findings.  I have offered to give her a hat to strain her urine.  She refuses.  We will prescribe her pain medications to go home with.advised to followup with urologist well.  She will be discharged in stable condition.  She has also been educated about her incidental small 2 mm right ovarian cyst.    MDM    Final diagnoses:   Kidney stone on  left side   Right ovarian cyst          Valencia Colon PA  01/15/19 [id number]

## 2019-01-16 ENCOUNTER — APPOINTMENT (OUTPATIENT)
Dept: CT IMAGING | Facility: HOSPITAL | Age: 34
End: 2019-01-16
Attending: INTERNAL MEDICINE

## 2019-01-17 ENCOUNTER — OFFICE VISIT (OUTPATIENT)
Dept: UROLOGY | Facility: CLINIC | Age: 34
End: 2019-01-17

## 2019-01-17 ENCOUNTER — APPOINTMENT (OUTPATIENT)
Dept: CT IMAGING | Facility: HOSPITAL | Age: 34
End: 2019-01-17
Attending: INTERNAL MEDICINE

## 2019-01-17 ENCOUNTER — HOSPITAL ENCOUNTER (OUTPATIENT)
Dept: GENERAL RADIOLOGY | Facility: HOSPITAL | Age: 34
Discharge: HOME | End: 2019-01-17
Attending: UROLOGY | Admitting: UROLOGY

## 2019-01-17 VITALS — TEMPERATURE: 98.8 F | BODY MASS INDEX: 21.53 KG/M2 | HEIGHT: 62 IN | WEIGHT: 117 LBS

## 2019-01-17 DIAGNOSIS — N20.1 LEFT URETERAL STONE: Primary | ICD-10-CM

## 2019-01-17 DIAGNOSIS — N20.0 KIDNEY STONE: Primary | ICD-10-CM

## 2019-01-17 DIAGNOSIS — N20.0 KIDNEY STONE: ICD-10-CM

## 2019-01-17 LAB
BACTERIA SPEC AEROBE CULT: ABNORMAL
BILIRUB BLD-MCNC: NEGATIVE MG/DL
CLARITY, POC: CLEAR
COLOR UR: YELLOW
GLUCOSE UR STRIP-MCNC: NEGATIVE MG/DL
KETONES UR QL: NEGATIVE
LEUKOCYTE EST, POC: NEGATIVE
NITRITE UR-MCNC: NEGATIVE MG/ML
PH UR: 8.5 [PH] (ref 5–8)
PROT UR STRIP-MCNC: NEGATIVE MG/DL
RBC # UR STRIP: NEGATIVE /UL
SP GR UR: 1.01 (ref 1–1.03)
UROBILINOGEN UR QL: NORMAL

## 2019-01-17 PROCEDURE — 99204 OFFICE O/P NEW MOD 45 MIN: CPT | Performed by: UROLOGY

## 2019-01-17 PROCEDURE — 74018 RADEX ABDOMEN 1 VIEW: CPT

## 2019-01-17 PROCEDURE — 81003 URINALYSIS AUTO W/O SCOPE: CPT | Performed by: UROLOGY

## 2019-01-17 RX ORDER — TAMSULOSIN HYDROCHLORIDE 0.4 MG/1
1 CAPSULE ORAL NIGHTLY
Qty: 14 CAPSULE | Refills: 0 | Status: SHIPPED | OUTPATIENT
Start: 2019-01-17 | End: 2019-05-01

## 2019-01-17 NOTE — PROGRESS NOTES
Ms. Pepe is 33 y.o. female    Chief Complaint   Patient presents with   • Flank Pain       Flank Pain   This is a new problem. The current episode started in the past 7 days. The problem occurs intermittently. The problem has been waxing and waning since onset. Pain location: left flank. The pain does not radiate. The pain is moderate. The pain is the same all the time. The symptoms are aggravated by position. Pertinent negatives include no abdominal pain, chest pain, dysuria, fever, headaches, numbness, pelvic pain or weakness. Risk factors: no history of stones. She has tried analgesics for the symptoms. The treatment provided moderate relief.       The following portions of the patient's history were reviewed and updated as appropriate: allergies, current medications, past family history, past medical history, past social history, past surgical history and problem list.    Review of Systems   Constitutional: Negative for appetite change, chills, fatigue, fever and unexpected weight change.   HENT: Negative for congestion, dental problem, ear pain, hearing loss, nosebleeds, sinus pressure and trouble swallowing.    Eyes: Negative for pain, discharge, redness and itching.   Respiratory: Negative for apnea, cough, choking and shortness of breath.    Cardiovascular: Negative for chest pain and palpitations.   Gastrointestinal: Positive for nausea and vomiting. Negative for abdominal distention, abdominal pain, blood in stool, constipation and diarrhea.   Endocrine: Negative for cold intolerance and heat intolerance.   Genitourinary: Positive for difficulty urinating, flank pain (left), frequency, hematuria and urgency. Negative for decreased urine volume, dyspareunia, dysuria, enuresis, genital sores, menstrual problem, pelvic pain, vaginal bleeding, vaginal discharge and vaginal pain.   Musculoskeletal: Negative for arthralgias, back pain and gait problem.   Skin: Negative for pallor, rash and wound.    Allergic/Immunologic: Negative for immunocompromised state.   Neurological: Negative for dizziness, tremors, seizures, weakness, numbness and headaches.   Hematological: Negative for adenopathy. Does not bruise/bleed easily.   Psychiatric/Behavioral: Negative for agitation, behavioral problems, hallucinations, self-injury and suicidal ideas.       I have reviewed the review of systems      Current Outpatient Medications:   •  HYDROcodone-acetaminophen (NORCO) 7.5-325 MG per tablet, Take 1 tablet by mouth Every 4 (Four) Hours As Needed for Moderate Pain ., Disp: 12 tablet, Rfl: 0  •  ketorolac (TORADOL) 10 MG tablet, Take 1 tablet by mouth Every 6 (Six) Hours As Needed for Moderate Pain ., Disp: 9 tablet, Rfl: 0  •  levonorgestrel (MIRENA, 52 MG,) 20 MCG/24HR IUD, Mirena 20 mcg/24 hr (5 years) intrauterine device  Take by intrauterine route., Disp: , Rfl:   •  loratadine-pseudoephedrine (CLARITIN-D 24-hour)  MG per 24 hr tablet, Take 1 tablet by mouth Daily., Disp: 30 tablet, Rfl: 1  •  Menthol, Topical Analgesic, (ICY HOT BACK EX), Apply  topically., Disp: , Rfl:   •  ondansetron (ZOFRAN) 4 MG tablet, Take 1 tablet by mouth Every 6 (Six) Hours As Needed for Nausea or Vomiting., Disp: 12 tablet, Rfl: 0  •  tamsulosin (FLOMAX) 0.4 MG capsule 24 hr capsule, Take 1 capsule by mouth Every Night., Disp: 14 capsule, Rfl: 0    Past Medical History:   Diagnosis Date   • Meningitis due to viruses 2012       Past Surgical History:   Procedure Laterality Date   • BREAST SURGERY      antonio breast augmentation 2005       Social History     Socioeconomic History   • Marital status:      Spouse name: Not on file   • Number of children: Not on file   • Years of education: Not on file   • Highest education level: Not on file   Occupational History     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  No second hand smoke exposure.        Family History   Problem Relation Age of Onset   • Lung disease Mother    • Heart disease Father    • Obesity Father    • Lung disease Father    • Hypertension Father    • Cancer Other    • Diabetes Other    • Heart disease Other    • Obesity Other        Objective    Temp 98.8 °F (37.1 °C)   Ht 157.5 cm (62\")   Wt 53.1 kg (117 lb)   BMI 21.40 kg/m²     Physical Exam  Constitutional: Well nourished, Well developed; No apparent distress, her vital signs are reviewed  Psychiatric: Appropriate affect; Alert and oriented  Eyes: Unremarkable  Musculoskeletal: Normal gait and station  GI: Abdomen is soft, non-tender  Respiratory: No distress; Unlabored movement; No accessory musculature needed with symmetric movements  Skin: No pallor or diaphoresis  CV: No LE edema    Admission on 01/15/2019, Discharged on 01/15/2019   Component Date Value Ref Range Status   • Extra Tube 01/15/2019 hold for add-on   Final    Auto resulted   • Extra Tube 01/15/2019 Hold for add-ons.   Final    Auto resulted.   • Extra Tube 01/15/2019 hold for add-on   Final    Auto resulted   • Extra Tube 01/15/2019 Hold for add-ons.   Final    Auto resulted.   • Color, UA 01/15/2019 Yellow  Yellow, Straw Final   • Appearance, UA 01/15/2019 Cloudy* Clear Final   • pH, UA 01/15/2019 7.5  5.0 - 8.0 Final   • Specific Gravity, UA 01/15/2019 1.026  1.005 - 1.030 Final   • Glucose, UA 01/15/2019 Negative  Negative Final   • Ketones, UA 01/15/2019 Negative  Negative Final   • Bilirubin, UA 01/15/2019 Negative  Negative Final   • Blood, UA 01/15/2019 Large (3+)* Negative Final   • Protein, UA 01/15/2019 30 mg/dL (1+)* Negative Final   • Leuk Esterase, UA 01/15/2019 Small (1+)* Negative Final   • Nitrite, UA 01/15/2019 Negative  Negative Final   • Urobilinogen, UA 01/15/2019 1.0 E.U./dL  0.2 - " 1.0 E.U./dL Final   • Urine Culture 01/15/2019 >100,000 CFU/mL Lactobacillus species*  Final   • Lipase 01/15/2019 69  23 - 203 U/L Final   • HCG, Urine QL 01/15/2019 Negative  Negative Final   • Amylase 01/15/2019 79  30 - 110 U/L Final   • Glucose 01/15/2019 129* 70 - 100 mg/dL Final   • BUN 01/15/2019 14  5 - 21 mg/dL Final   • Creatinine 01/15/2019 0.90  0.50 - 1.40 mg/dL Final   • Sodium 01/15/2019 140  135 - 145 mmol/L Final   • Potassium 01/15/2019 4.2  3.5 - 5.3 mmol/L Final   • Chloride 01/15/2019 102  98 - 110 mmol/L Final   • CO2 01/15/2019 28.0  24.0 - 31.0 mmol/L Final   • Calcium 01/15/2019 8.8  8.4 - 10.4 mg/dL Final   • Total Protein 01/15/2019 7.1  6.3 - 8.7 g/dL Final   • Albumin 01/15/2019 4.30  3.50 - 5.00 g/dL Final   • ALT (SGPT) 01/15/2019 25  0 - 54 U/L Final   • AST (SGOT) 01/15/2019 23  7 - 45 U/L Final   • Alkaline Phosphatase 01/15/2019 44  24 - 120 U/L Final   • Total Bilirubin 01/15/2019 0.6  0.1 - 1.0 mg/dL Final   • eGFR Non African Amer 01/15/2019 72  >60 mL/min/1.73 Final   • Globulin 01/15/2019 2.8  gm/dL Final   • A/G Ratio 01/15/2019 1.5  1.1 - 2.5 g/dL Final   • BUN/Creatinine Ratio 01/15/2019 15.6  7.0 - 25.0 Final   • Anion Gap 01/15/2019 10.0  4.0 - 13.0 mmol/L Final   • Blood Culture 01/15/2019 No growth at 2 days   Preliminary   • WBC 01/15/2019 9.20  4.80 - 10.80 10*3/mm3 Final   • RBC 01/15/2019 4.52  4.20 - 5.40 10*6/mm3 Final   • Hemoglobin 01/15/2019 13.9  12.0 - 16.0 g/dL Final   • Hematocrit 01/15/2019 39.3  37.0 - 47.0 % Final   • MCV 01/15/2019 86.9  82.0 - 98.0 fL Final   • MCH 01/15/2019 30.8  28.0 - 32.0 pg Final   • MCHC 01/15/2019 35.4  33.0 - 36.0 g/dL Final   • RDW 01/15/2019 11.5* 12.0 - 15.0 % Final   • RDW-SD 01/15/2019 36.3* 40.0 - 54.0 fl Final   • MPV 01/15/2019 10.7  6.0 - 12.0 fL Final   • Platelets 01/15/2019 187  130 - 400 10*3/mm3 Final   • Neutrophil % 01/15/2019 76.2  39.0 - 78.0 % Final   • Lymphocyte % 01/15/2019 18.2  15.0 - 45.0 % Final   •  Monocyte % 01/15/2019 4.0  4.0 - 12.0 % Final   • Eosinophil % 01/15/2019 0.7  0.0 - 4.0 % Final   • Basophil % 01/15/2019 0.5  0.0 - 2.0 % Final   • Immature Grans % 01/15/2019 0.4  0.0 - 5.0 % Final   • Neutrophils, Absolute 01/15/2019 7.01  1.87 - 8.40 10*3/mm3 Final   • Lymphocytes, Absolute 01/15/2019 1.67  0.72 - 4.86 10*3/mm3 Final   • Monocytes, Absolute 01/15/2019 0.37  0.19 - 1.30 10*3/mm3 Final   • Eosinophils, Absolute 01/15/2019 0.06  0.00 - 0.70 10*3/mm3 Final   • Basophils, Absolute 01/15/2019 0.05  0.00 - 0.20 10*3/mm3 Final   • Immature Grans, Absolute 01/15/2019 0.04* 0.00 - 0.03 10*3/mm3 Final   • nRBC 01/15/2019 0.0  0.0 - 0.0 /100 WBC Final   • RBC, UA 01/15/2019 Too Numerous to Count* None Seen /HPF Final   • WBC, UA 01/15/2019 0-2* None Seen /HPF Final   • Bacteria, UA 01/15/2019 None Seen  None Seen /HPF Final   • Squamous Epithelial Cells, UA 01/15/2019 0-2  None Seen, 0-2 /HPF Final   • Methodology 01/15/2019 Manual Light Microscopy   Final       Results for orders placed or performed in visit on 01/17/19   POC Urinalysis Dipstick, Multipro   Result Value Ref Range    Color Yellow Yellow, Straw, Dark Yellow, Aaliyah    Clarity, UA Clear Clear    Glucose, UA Negative Negative, 1000 mg/dL (3+) mg/dL    Bilirubin Negative Negative    Ketones, UA Negative Negative    Specific Gravity  1.015 1.005 - 1.030    Blood, UA Negative Negative    pH, Urine 8.5 (A) 5.0 - 8.0    Protein, POC Negative Negative mg/dL    Urobilinogen, UA Normal Normal    Nitrite, UA Negative Negative    Leukocytes Negative Negative     Patient's Body mass index is 21.4 kg/m². BMI is within normal parameters. No follow-up required..    Assessment and Plan    Kym was seen today for flank pain.    Diagnoses and all orders for this visit:    Left ureteral stone  -     POC Urinalysis Dipstick, Multipro  -     tamsulosin (FLOMAX) 0.4 MG capsule 24 hr capsule; Take 1 capsule by mouth Every Night.  -     US Renal Bilateral;  "Future    I independently reviewed her CT scan which shows a 4-5 millimeters stone in the distal ureter.  She also had a KUB today which shows the stone is in essentially the same position.  Labs reviewed showed a creatinine of 0.9 and alive blood cell count of 9.2.    Patient is nontoxic today with no fevers chills nausea vomiting.  She states that her pain has improved.  She was given options for treatment of stone and she would like a trial passage either nerve Flomax today as such.  I will see her back next week with a renal ultrasound.  If she has any problems over the weekend she will call me.    Ms. Pepe has a ureteral stone without any absolute indication for intervention at this time.  After hearing all available options for the treatment of this stone, the patient has chosen expulsive therapy with an alpha blocker. We did discuss that this is an off label indication for the use of an alpha blocker.  The patient understands to contact me for fever, flank pain that is refractory to pain relief from analgesics, excessive vomiting that prevents the patient from hydrating, or hematuria severe enough to produce clots.  The importance of follow up is discussed, as the absence of pain would not necessarily mean that the stone has passed.  We discussed that this \"silent obstruction\" could lead to infection or permanent kidney damage if not treated appropriately.  I also discussed the side effects of alpha blockers, including orthostasis, central mediated dizziness, ejaculatory dysfunction (in males), and rhinitis.        CT independent review  The CT scan of the abdomen/pelvis done without contrast is available for me to review.  Treatment recommendations require an independent review.  First I scanned the liver, spleen, and bowel pattern.  The retroperitoneum including the major vessels and lymphatic packages are briefly reviewed.  This film as been reviewed by the radiologist to determine any non urologic " abnormalities that are present.  The kidneys are closely inspected for size, symmetry, contour, parenchymal thickness, perinephric reaction, presence of calcifications, and intrarenal dilation of the collecting system.  The ureters are inspected for their course, caliber, and any calcifications.  The bladder is inspected for its thickness, size, and presence of any calcifications.  This scan shows:    The right kidney appears normal on this non-contrasted CT scan.  The renal parenchymal is normal in thickness.  There are no solid masses or cysts.  There is no hydronephrosis.  There are no stones.      The left kidney appears abnormal on this non contrasted CT scan.   Hydronephrosis and hydroureter down to the level of a 4-5 millimeters stone in the distal ureter    The bladder appears normal on this non-contrasted CT scan.  The bladder appears normal in thickness.  There no masses or stones seen on this exam.

## 2019-01-18 ENCOUNTER — TELEPHONE (OUTPATIENT)
Dept: UROLOGY | Facility: CLINIC | Age: 34
End: 2019-01-18

## 2019-01-18 NOTE — TELEPHONE ENCOUNTER
Pt called nurse line expressing that she has passed her stone and wanted to know what we want her to do moving forward; Nurse tried to call pt back to notify that she still needs to come to her follow up apt on 1/23/19 with her Renal US done prior and her stones. Pt verified she still had the stones and understanding for US and apt.

## 2019-01-20 LAB — BACTERIA SPEC AEROBE CULT: NORMAL

## 2019-01-22 ENCOUNTER — TELEPHONE (OUTPATIENT)
Dept: UROLOGY | Facility: CLINIC | Age: 34
End: 2019-01-22

## 2019-01-22 DIAGNOSIS — N20.0 KIDNEY STONE: Primary | ICD-10-CM

## 2019-01-22 NOTE — TELEPHONE ENCOUNTER
Patient called today regarding her renal us and appt with Dr Panchal tomorrow. She states that radiology called and gave her an estimate cost of $745 for the us and she wants to know if it is absolutely necessary to have this? She is concerned about the cost.

## 2019-01-22 NOTE — TELEPHONE ENCOUNTER
Per Kwame the patient could have the renal us at [facility] for a lesser cost. Spoke with the patient and she is agreeable to that. I have made an appt for 10am 1/23/19 at [facility] for the patient. Patient was informed to be NPO and also to be prepared for payment of about $75 co insurance. Patient voiced understanding.

## 2019-01-23 ENCOUNTER — OFFICE VISIT (OUTPATIENT)
Dept: UROLOGY | Facility: CLINIC | Age: 34
End: 2019-01-23

## 2019-01-23 ENCOUNTER — APPOINTMENT (OUTPATIENT)
Dept: ULTRASOUND IMAGING | Facility: HOSPITAL | Age: 34
End: 2019-01-23
Attending: UROLOGY

## 2019-01-23 VITALS
SYSTOLIC BLOOD PRESSURE: 117 MMHG | DIASTOLIC BLOOD PRESSURE: 84 MMHG | HEIGHT: 62 IN | WEIGHT: 117 LBS | BODY MASS INDEX: 21.53 KG/M2 | TEMPERATURE: 98.2 F

## 2019-01-23 DIAGNOSIS — N20.1 LEFT URETERAL STONE: Primary | ICD-10-CM

## 2019-01-23 DIAGNOSIS — N20.0 KIDNEY STONE: ICD-10-CM

## 2019-01-23 LAB
BILIRUB BLD-MCNC: NEGATIVE MG/DL
CLARITY, POC: CLEAR
COLOR UR: YELLOW
GLUCOSE UR STRIP-MCNC: NEGATIVE MG/DL
KETONES UR QL: NEGATIVE
LEUKOCYTE EST, POC: NEGATIVE
NITRITE UR-MCNC: NEGATIVE MG/ML
PH UR: 7 [PH] (ref 5–8)
PROT UR STRIP-MCNC: NEGATIVE MG/DL
RBC # UR STRIP: NEGATIVE /UL
SP GR UR: 1.02 (ref 1–1.03)
UROBILINOGEN UR QL: NORMAL

## 2019-01-23 PROCEDURE — 99212 OFFICE O/P EST SF 10 MIN: CPT | Performed by: UROLOGY

## 2019-01-23 PROCEDURE — 81003 URINALYSIS AUTO W/O SCOPE: CPT | Performed by: UROLOGY

## 2019-01-23 NOTE — PROGRESS NOTES
Ms. Pepe is 33 y.o. female    Chief Complaint   Patient presents with   • left ureral stone       History of Present Illness  Recent stone episode  The context of today's visit is following a recent stone episode The patient has passed the stone or fragments of it. The stone location is in the Distal ureter. The onset of this was acute. The course is improving with this management. Associated symptom(s) can be described by resolution after passing stone. The patient did get films to review today.     The following portions of the patient's history were reviewed and updated as appropriate: allergies, current medications, past family history, past medical history, past social history, past surgical history and problem list.    Review of Systems   Constitutional: Negative for chills and fever.   Gastrointestinal: Negative for abdominal pain, anal bleeding and blood in stool.   Genitourinary: Negative for dysuria, frequency, hematuria and urgency.       I have reviewed the review of systems      Current Outpatient Medications:   •  HYDROcodone-acetaminophen (NORCO) 7.5-325 MG per tablet, Take 1 tablet by mouth Every 4 (Four) Hours As Needed for Moderate Pain ., Disp: 12 tablet, Rfl: 0  •  ketorolac (TORADOL) 10 MG tablet, Take 1 tablet by mouth Every 6 (Six) Hours As Needed for Moderate Pain ., Disp: 9 tablet, Rfl: 0  •  levonorgestrel (MIRENA, 52 MG,) 20 MCG/24HR IUD, Mirena 20 mcg/24 hr (5 years) intrauterine device  Take by intrauterine route., Disp: , Rfl:   •  loratadine-pseudoephedrine (CLARITIN-D 24-hour)  MG per 24 hr tablet, Take 1 tablet by mouth Daily., Disp: 30 tablet, Rfl: 1  •  Menthol, Topical Analgesic, (ICY HOT BACK EX), Apply  topically., Disp: , Rfl:   •  ondansetron (ZOFRAN) 4 MG tablet, Take 1 tablet by mouth Every 6 (Six) Hours As Needed for Nausea or Vomiting., Disp: 12 tablet, Rfl: 0  •  tamsulosin (FLOMAX) 0.4 MG capsule 24 hr capsule, Take 1 capsule by mouth Every Night., Disp: 14  "capsule, Rfl: 0    Past Medical History:   Diagnosis Date   • Meningitis due to viruses 2012       Past Surgical History:   Procedure Laterality Date   • BREAST SURGERY      antonio breast augmentation 2005       Social History     Socioeconomic History   • Marital status:      Spouse name: Not on file   • Number of children: Not on file   • Years of education: Not on file   • Highest education level: Not on file   Occupational History     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  No second hand smoke exposure.        Family History   Problem Relation Age of Onset   • Lung disease Mother    • Heart disease Father    • Obesity Father    • Lung disease Father    • Hypertension Father    • Cancer Other    • Diabetes Other    • Heart disease Other    • Obesity Other        Objective    /84   Temp 98.2 °F (36.8 °C)   Ht 157.5 cm (62\")   Wt 53.1 kg (117 lb)   BMI 21.40 kg/m²     Physical Exam    Office Visit on 01/17/2019   Component Date Value Ref Range Status   • Color 01/17/2019 Yellow  Yellow, Straw, Dark Yellow, Aaliyah Final   • Clarity, UA 01/17/2019 Clear  Clear Final   • Glucose, UA 01/17/2019 Negative  Negative, 1000 mg/dL (3+) mg/dL Final   • Bilirubin 01/17/2019 Negative  Negative Final   • Ketones, UA 01/17/2019 Negative  Negative Final   • Specific Gravity  01/17/2019 1.015  1.005 - 1.030 Final   • Blood, UA 01/17/2019 Negative  Negative Final   • pH, Urine 01/17/2019 8.5* 5.0 - 8.0 Final   • Protein, POC 01/17/2019 Negative  Negative mg/dL Final   • Urobilinogen, UA 01/17/2019 Normal  Normal Final   • Nitrite, UA 01/17/2019 Negative  Negative Final   • Leukocytes 01/17/2019 Negative  Negative Final       Results for orders placed or " performed in visit on 01/23/19   POC Urinalysis Dipstick, Multipro   Result Value Ref Range    Color Yellow Yellow, Straw, Dark Yellow, Aaliyah    Clarity, UA Clear Clear    Glucose, UA Negative Negative, 1000 mg/dL (3+) mg/dL    Bilirubin Negative Negative    Ketones, UA Negative Negative    Specific Gravity  1.025 1.005 - 1.030    Blood, UA Negative Negative    pH, Urine 7.0 5.0 - 8.0    Protein, POC Negative Negative mg/dL    Urobilinogen, UA Normal Normal    Nitrite, UA Negative Negative    Leukocytes Negative Negative     Assessment and Plan    Kym was seen today for left ureral stone.    Diagnoses and all orders for this visit:    Left ureteral stone  -     POC Urinalysis Dipstick, Multipro    Patient comes in with stone and hand.  She states that her symptoms have resolved.  She has passed the stone.  Renal ultrasound shows resolution of hydronephrosis.  No history of stones and she does not need a full metabolic workup for this.  She will call me in the future if she has further stones.  I have given her written information on dietary recommendations for stone formers.

## 2019-04-30 ENCOUNTER — OFFICE VISIT (OUTPATIENT)
Dept: RETAIL CLINIC | Facility: CLINIC | Age: 34
End: 2019-04-30

## 2019-04-30 VITALS — TEMPERATURE: 98.2 F

## 2019-04-30 DIAGNOSIS — Z11.1 PPD SCREENING TEST: Primary | ICD-10-CM

## 2019-04-30 PROCEDURE — 86580 TB INTRADERMAL TEST: CPT | Performed by: NURSE PRACTITIONER

## 2019-04-30 NOTE — PROGRESS NOTES
Reason for Appointment   1. TB skin test     HPI:   Kym Pepe presents for TB skin test today. Denies history of TB or positive skin test. Questionnaire completed and consent signed.          Procedures   TB Skin test:   Screening form completed     Patient counseled to have results read between 48-72 hours  Patient tolerated procedure well .   Patient teaching on care of site given           Follow Up   Return in 48 hours to read test

## 2019-05-01 ENCOUNTER — OFFICE VISIT (OUTPATIENT)
Dept: RETAIL CLINIC | Facility: CLINIC | Age: 34
End: 2019-05-01

## 2019-05-01 VITALS
BODY MASS INDEX: 21.53 KG/M2 | TEMPERATURE: 98.1 F | SYSTOLIC BLOOD PRESSURE: 90 MMHG | HEIGHT: 62 IN | RESPIRATION RATE: 20 BRPM | OXYGEN SATURATION: 99 % | DIASTOLIC BLOOD PRESSURE: 60 MMHG | WEIGHT: 117 LBS | HEART RATE: 67 BPM

## 2019-05-01 DIAGNOSIS — H04.301 TEAR DUCT INFECTION, RIGHT: Primary | ICD-10-CM

## 2019-05-01 PROCEDURE — 99212 OFFICE O/P EST SF 10 MIN: CPT | Performed by: NURSE PRACTITIONER

## 2019-05-01 RX ORDER — TOBRAMYCIN 3 MG/ML
2 SOLUTION/ DROPS OPHTHALMIC 4 TIMES DAILY
Qty: 5 ML | Refills: 0 | Status: SHIPPED | OUTPATIENT
Start: 2019-05-01 | End: 2019-08-19

## 2019-05-01 RX ORDER — TOBRAMYCIN 3 MG/ML
2 SOLUTION/ DROPS OPHTHALMIC 4 TIMES DAILY
Qty: 5 ML | Refills: 0 | Status: SHIPPED | OUTPATIENT
Start: 2019-05-01 | End: 2019-05-01

## 2019-05-01 NOTE — PATIENT INSTRUCTIONS
Pt advised she has infection in tear duct.  Try Tobramycin 2 drops four times a day for 10 days. Use baby shampoo and q tip to massage and clean around the area four times a day.    If she is not seeing an improvement in 24 to 48 hrs she will need to see an opth.

## 2019-05-01 NOTE — PROGRESS NOTES
Subjective   Kym Pepe is a 34 y.o. female who presents to the clinic with: right eye      Onset last night at ball game the right became irritated like something was aggravating.       Conjunctivitis    The current episode started yesterday. The onset was sudden. The problem occurs rarely. The problem has been gradually worsening. The problem is mild. Nothing relieves the symptoms. Nothing aggravates the symptoms. Associated symptoms include headaches, eye discharge and eye pain. Pertinent negatives include no fever, no decreased vision, no double vision, no eye itching, no photophobia, no congestion, no ear discharge, no ear pain, no hearing loss, no mouth sores, no rhinorrhea, no sore throat, no cough, no URI and no eye redness. The eye pain is moderate. The right eye is affected. The eye pain is not associated with movement. The eyelid exhibits redness (tear duct with white drainage from tear duct). She has been behaving normally. She has been eating and drinking normally. There were no sick contacts. Services received include medications given.        The following portions of the patient's history were reviewed and updated as appropriate: allergies, current medications, past family history, past medical history, past social history, past surgical history and problem list.        Review of Systems   Constitutional: Negative for fever.   HENT: Negative for congestion, ear discharge, ear pain, hearing loss, mouth sores, rhinorrhea and sore throat.    Eyes: Positive for pain and discharge. Negative for double vision, photophobia, redness and itching.   Respiratory: Negative for cough.    Neurological: Positive for headaches.         Objective   Physical Exam   Constitutional: She is oriented to person, place, and time. Vital signs are normal. She appears well-developed and well-nourished.   HENT:   Head: Normocephalic and atraumatic.   Eyes: Conjunctivae and lids are normal. Pupils are equal, round, and  reactive to light. Right eye exhibits discharge. Left eye exhibits no discharge.   From right tear duct   Neck: Neck supple.   Cardiovascular: Normal rate.   Pulmonary/Chest: Effort normal.   Neurological: She is alert and oriented to person, place, and time.   Skin: Skin is warm, dry and intact.   Psychiatric: She has a normal mood and affect. Her speech is normal and behavior is normal. Judgment and thought content normal.   Vitals reviewed.        Assessment/Plan   Kym was seen today for conjunctivitis.    Diagnoses and all orders for this visit:    Tear duct infection, right    Other orders  -     Discontinue: tobramycin 0.3 % solution ophthalmic solution; Administer 2 drops to the right eye 4 (Four) Times a Day.  -     tobramycin 0.3 % solution ophthalmic solution; Administer 2 drops to the right eye 4 (Four) Times a Day.          Pt advised she has infection in tear duct.  Try Tobramycin 2 drops four times a day for 10 days. Use baby shampoo and q tip to massage and clean around the area four times a day.    If she is not seeing an improvement in 24 to 48 hrs she will need to see an opth.

## 2019-08-19 ENCOUNTER — OFFICE VISIT (OUTPATIENT)
Dept: RETAIL CLINIC | Facility: CLINIC | Age: 34
End: 2019-08-19

## 2019-08-19 VITALS
HEART RATE: 92 BPM | RESPIRATION RATE: 20 BRPM | WEIGHT: 115 LBS | TEMPERATURE: 98.2 F | HEIGHT: 62 IN | SYSTOLIC BLOOD PRESSURE: 90 MMHG | DIASTOLIC BLOOD PRESSURE: 70 MMHG | BODY MASS INDEX: 21.16 KG/M2 | OXYGEN SATURATION: 98 %

## 2019-08-19 DIAGNOSIS — J30.2 SEASONAL ALLERGIES: ICD-10-CM

## 2019-08-19 DIAGNOSIS — J01.00 ACUTE MAXILLARY SINUSITIS, RECURRENCE NOT SPECIFIED: Primary | ICD-10-CM

## 2019-08-19 DIAGNOSIS — J01.10 ACUTE FRONTAL SINUSITIS, RECURRENCE NOT SPECIFIED: ICD-10-CM

## 2019-08-19 PROCEDURE — 99213 OFFICE O/P EST LOW 20 MIN: CPT | Performed by: NURSE PRACTITIONER

## 2019-08-19 PROCEDURE — 96372 THER/PROPH/DIAG INJ SC/IM: CPT | Performed by: NURSE PRACTITIONER

## 2019-08-19 RX ORDER — METHYLPREDNISOLONE 4 MG/1
TABLET ORAL
Qty: 1 EACH | Refills: 0 | Status: SHIPPED | OUTPATIENT
Start: 2019-08-19 | End: 2019-11-07

## 2019-08-19 RX ORDER — AZITHROMYCIN 250 MG/1
TABLET, FILM COATED ORAL
Qty: 6 TABLET | Refills: 0 | Status: SHIPPED | OUTPATIENT
Start: 2019-08-19 | End: 2019-11-07

## 2019-08-19 RX ORDER — DEXAMETHASONE SODIUM PHOSPHATE 4 MG/ML
4 INJECTION, SOLUTION INTRA-ARTICULAR; INTRALESIONAL; INTRAMUSCULAR; INTRAVENOUS; SOFT TISSUE ONCE
Status: COMPLETED | OUTPATIENT
Start: 2019-08-19 | End: 2019-08-19

## 2019-08-19 RX ADMIN — DEXAMETHASONE SODIUM PHOSPHATE 4 MG: 4 INJECTION, SOLUTION INTRA-ARTICULAR; INTRALESIONAL; INTRAMUSCULAR; INTRAVENOUS; SOFT TISSUE at 09:34

## 2019-08-19 NOTE — PATIENT INSTRUCTIONS
Pt advised she has sinus infection frontal and maxillary from allergies.  Instructed pt on decadron, medrol dospak, zpak, purpose, dosage, side effects, and frequency. Decadron 4 mg IM given in right hip.  If pt does not improve or worsens in 3 to 4 days follow up with pcp If pt develops high fever, severe, headache, or changes in s/s go to ER.

## 2019-08-19 NOTE — PROGRESS NOTES
Subjective   Kym Pepe is a 34 y.o. female who presents to the clinic with: allergies      Onset 3 days with alleriges but then last night hit her hard.  Taking zyrtec for one week and benadryl last night.  Using flonase      URI    This is a new problem. The current episode started 1 to 4 weeks ago. The problem has been gradually worsening. The maximum temperature recorded prior to her arrival was 100.4 - 100.9 F. Associated symptoms include congestion, ear pain, headaches, rhinorrhea, sinus pain, sneezing and a sore throat. Pertinent negatives include no chest pain, coughing or wheezing. Associated symptoms comments: Teeth hurting. She has tried antihistamine (flonase) for the symptoms. The treatment provided mild relief.        The following portions of the patient's history were reviewed and updated as appropriate: allergies, current medications, past family history, past medical history, past social history, past surgical history and problem list.        Review of Systems   Constitutional: Positive for diaphoresis and fever. Negative for chills.   HENT: Positive for congestion, ear pain, rhinorrhea, sinus pressure, sinus pain, sneezing and sore throat.    Respiratory: Negative for cough, shortness of breath and wheezing.    Cardiovascular: Negative for chest pain.   Neurological: Positive for headaches.         Objective   Physical Exam   Constitutional: She is oriented to person, place, and time. Vital signs are normal. She appears well-developed and well-nourished.   HENT:   Head: Normocephalic and atraumatic.   Right Ear: Hearing, external ear and ear canal normal.   Left Ear: Hearing, external ear and ear canal normal.   Nose: Mucosal edema and rhinorrhea present. Right sinus exhibits maxillary sinus tenderness and frontal sinus tenderness. Left sinus exhibits maxillary sinus tenderness and frontal sinus tenderness.   Mouth/Throat: Uvula is midline and mucous membranes are normal. Oropharyngeal  exudate, posterior oropharyngeal edema and posterior oropharyngeal erythema present. Tonsils are 2+ on the right. Tonsils are 2+ on the left. No tonsillar exudate.   Rick tms dull and cloudy with scattered light reflex and bulging   Eyes: Conjunctivae are normal. Pupils are equal, round, and reactive to light.   Neck: Neck supple.   Cardiovascular: Normal rate, regular rhythm, S1 normal, S2 normal and normal heart sounds. Exam reveals no gallop, no S3, no S4 and no friction rub.   No murmur heard.  Pulmonary/Chest: Effort normal and breath sounds normal. No respiratory distress. She has no wheezes. She has no rales.   Lymphadenopathy:        Head (right side): Tonsillar adenopathy present.        Head (left side): Tonsillar adenopathy present.     She has cervical adenopathy.        Right cervical: Superficial cervical adenopathy present.        Left cervical: Superficial cervical adenopathy present.   Neurological: She is alert and oriented to person, place, and time.   Skin: Skin is warm, dry and intact.   Psychiatric: She has a normal mood and affect. Her speech is normal and behavior is normal. Judgment and thought content normal.   Vitals reviewed.        Assessment/Plan   Kym was seen today for allergies.    Diagnoses and all orders for this visit:    Acute maxillary sinusitis, recurrence not specified  -     dexamethasone (DECADRON) injection 4 mg    Acute frontal sinusitis, recurrence not specified  -     dexamethasone (DECADRON) injection 4 mg    Seasonal allergies    Other orders  -     azithromycin (ZITHROMAX) 250 MG tablet; Take 2 tablets the first day, then 1 tablet daily for 4 days.  -     methylPREDNISolone (MEDROL, KAROLYN,) 4 MG tablet; Take as directed on package instructions.        Pt advised she has sinus infection frontal and maxillary from allergies.  Instructed pt on decadron, medrol dospak, zpak, purpose, dosage, side effects, and frequency. Decadron 4 mg IM given in right hip.  If pt does  not improve or worsens in 3 to 4 days follow up with pcp If pt develops high fever, severe, headache, or changes in s/s go to ER.

## 2019-11-07 ENCOUNTER — IMMUNIZATION (OUTPATIENT)
Dept: RETAIL CLINIC | Facility: CLINIC | Age: 34
End: 2019-11-07

## 2019-11-07 VITALS — TEMPERATURE: 98.2 F

## 2019-11-07 DIAGNOSIS — Z28.39 IMMUNIZATION DEFICIENCY: Primary | ICD-10-CM

## 2019-11-07 PROCEDURE — 90471 IMMUNIZATION ADMIN: CPT | Performed by: NURSE PRACTITIONER

## 2019-11-07 PROCEDURE — 90686 IIV4 VACC NO PRSV 0.5 ML IM: CPT | Performed by: NURSE PRACTITIONER

## 2019-11-07 NOTE — PATIENT INSTRUCTIONS
Forms for [facility] Stock flu vaccine filled out.  Flu shot is to be billed to insurance company.  Follow up as needed.

## 2019-11-07 NOTE — PROGRESS NOTES
Kym Pepe is a 34 y.o. female who presents to the clinic for a flu shot. No fever or illness today. No contraindications for flu vaccine. Kym filled out questionnaire and signed consent.    History of Present Illness No illness or fever    The following portions of the patient's history were reviewed and updated as appropriate: allergies, current medications, past family history, past medical history, past social history, past surgical history and problem list.        Review of Systems   Constitutional: Negative for fever.         Objective   Physical Exam   Constitutional: She is oriented to person, place, and time. Vital signs are normal. She appears well-developed and well-nourished.   HENT:   Head: Normocephalic and atraumatic.   Eyes: Pupils are equal, round, and reactive to light.   Neck: Neck supple.   Neurological: She is alert and oriented to person, place, and time.   Skin: Skin is warm, dry and intact.   Psychiatric: She has a normal mood and affect. Her speech is normal and behavior is normal. Judgment and thought content normal.         Assessment/Plan   Influenza vaccination    Forms for [facility] Stock flu vaccine filled out.  Flu shot is to be billed to insurance company.  Follow up as needed.

## 2020-02-03 ENCOUNTER — OFFICE VISIT (OUTPATIENT)
Dept: RETAIL CLINIC | Facility: CLINIC | Age: 35
End: 2020-02-03

## 2020-02-03 VITALS
RESPIRATION RATE: 20 BRPM | OXYGEN SATURATION: 98 % | DIASTOLIC BLOOD PRESSURE: 70 MMHG | SYSTOLIC BLOOD PRESSURE: 100 MMHG | BODY MASS INDEX: 21.16 KG/M2 | TEMPERATURE: 98.3 F | HEART RATE: 79 BPM | WEIGHT: 115 LBS | HEIGHT: 62 IN

## 2020-02-03 DIAGNOSIS — J02.9 ACUTE PHARYNGITIS, UNSPECIFIED ETIOLOGY: ICD-10-CM

## 2020-02-03 DIAGNOSIS — J02.9 SORE THROAT: Primary | ICD-10-CM

## 2020-02-03 LAB
EXPIRATION DATE: NORMAL
EXPIRATION DATE: NORMAL
HETEROPH AB SER QL LA: NEGATIVE
INTERNAL CONTROL: NORMAL
INTERNAL CONTROL: NORMAL
Lab: NORMAL
Lab: NORMAL
S PYO AG THROAT QL: NEGATIVE

## 2020-02-03 PROCEDURE — 86308 HETEROPHILE ANTIBODY SCREEN: CPT | Performed by: NURSE PRACTITIONER

## 2020-02-03 PROCEDURE — 87880 STREP A ASSAY W/OPTIC: CPT | Performed by: NURSE PRACTITIONER

## 2020-02-03 PROCEDURE — 99213 OFFICE O/P EST LOW 20 MIN: CPT | Performed by: NURSE PRACTITIONER

## 2020-02-03 NOTE — PATIENT INSTRUCTIONS
Pt advised strep and mono are neg.  I suspect she has viral illness.  Warm saline gargles, lozenges, chloraseptic spray, tylenol or ibuprofen every 4 hrs as needed for pain or fever.  If pt sore throat persists for 2 wks follow up with pcp.

## 2020-02-03 NOTE — PROGRESS NOTES
Subjective   Kym Pepe is a 34 y.o. female who presents to the clinic with: sore throat      Onset with sore throat off and on for over a week with it getting worse over the weekend.  Very fatigued.  Thinks she has mono.  Headache and lymph nodes enlarged     Sore Throat    This is a new problem. Episode onset: 1 wk. The problem has been gradually worsening. There has been no fever. The pain is at a severity of 7/10. The pain is severe. Associated symptoms include headaches, swollen glands and trouble swallowing. Pertinent negatives include no congestion, coughing, ear pain, hoarse voice, shortness of breath or vomiting. 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 She has tried NSAIDs for the symptoms. The treatment provided no relief.        The following portions of the patient's history were reviewed and updated as appropriate: allergies, current medications, past family history, past medical history, past social history, past surgical history and problem list.        Review of Systems   Constitutional: Negative for chills, diaphoresis and fever.   HENT: Positive for sore throat and trouble swallowing. Negative for congestion, ear pain, hoarse voice and rhinorrhea.    Respiratory: Negative for cough, shortness of breath and wheezing.    Gastrointestinal: Negative for nausea and vomiting.   Neurological: Positive for headaches.         Objective   Physical Exam   Constitutional: She is oriented to person, place, and time. Vital signs are normal. She appears well-developed and well-nourished.   HENT:   Head: Normocephalic and atraumatic.   Right Ear: Hearing, tympanic membrane, external ear and ear canal normal.   Left Ear: Hearing, tympanic membrane, external ear and ear canal normal.   Nose: Nose normal.   Mouth/Throat: Uvula is midline, oropharynx is clear and moist and mucous membranes are normal. Tonsils are 2+ on the right. Tonsils are 2+ on the left. No tonsillar exudate.   Eyes:  Pupils are equal, round, and reactive to light. Conjunctivae are normal.   Neck: Neck supple.   Cardiovascular: Normal rate, regular rhythm, S1 normal, S2 normal and normal heart sounds. Exam reveals no gallop, no S3, no S4 and no friction rub.   No murmur heard.  Pulmonary/Chest: Effort normal and breath sounds normal. No respiratory distress. She has no wheezes. She has no rales.   Lymphadenopathy:     She has no cervical adenopathy.   Neurological: She is alert and oriented to person, place, and time.   Skin: Skin is warm, dry and intact.   Psychiatric: She has a normal mood and affect. Her speech is normal and behavior is normal. Judgment and thought content normal.   Vitals reviewed.        Assessment/Plan   Kym was seen today for sore throat.    Diagnoses and all orders for this visit:    Sore throat  -     POC Rapid Strep A    Acute pharyngitis, unspecified etiology    POCT MONO      Pt advised strep and mono are neg.  I suspect she has viral illness.  Warm saline gargles, lozenges, chloraseptic spray, tylenol or ibuprofen every 4 hrs as needed for pain or fever.  If pt sore throat persists for 2 wks follow up with pcp.

## 2020-03-02 ENCOUNTER — OFFICE VISIT (OUTPATIENT)
Dept: RETAIL CLINIC | Facility: CLINIC | Age: 35
End: 2020-03-02

## 2020-03-02 VITALS
TEMPERATURE: 98.9 F | RESPIRATION RATE: 20 BRPM | HEART RATE: 91 BPM | BODY MASS INDEX: 21.16 KG/M2 | HEIGHT: 62 IN | WEIGHT: 115 LBS | SYSTOLIC BLOOD PRESSURE: 90 MMHG | DIASTOLIC BLOOD PRESSURE: 60 MMHG | OXYGEN SATURATION: 98 %

## 2020-03-02 DIAGNOSIS — R50.9 FEVER, UNSPECIFIED FEVER CAUSE: Primary | ICD-10-CM

## 2020-03-02 DIAGNOSIS — R68.89 FLU-LIKE SYMPTOMS: ICD-10-CM

## 2020-03-02 LAB
EXPIRATION DATE: NORMAL
FLUAV AG NPH QL: NEGATIVE
FLUBV AG NPH QL: NEGATIVE
INTERNAL CONTROL: NORMAL
Lab: NORMAL

## 2020-03-02 PROCEDURE — 99213 OFFICE O/P EST LOW 20 MIN: CPT | Performed by: NURSE PRACTITIONER

## 2020-03-02 PROCEDURE — 87804 INFLUENZA ASSAY W/OPTIC: CPT | Performed by: NURSE PRACTITIONER

## 2020-03-02 NOTE — PATIENT INSTRUCTIONS
Pt advised flu is neg for a and b. I will not do strep since she does not have sore throat.  I suspect she has the flu but too early to show on test.m Pt requested zofluza.  I advised it is a new drug and side effects can show up later that they did not know about.  I usually do not prescribe new drugs because of this reason.  Pt states she will take her chances.  Instructed pt on zofluza purpose, dosage, frequency, and side effects.  Observe and treat the s/s with delsym or robitussin dm as needed for cough, zyrtec or claritin as needed for runny nose, tylenol or ibuprofen as needed for fever over 101 and pain.  If pt worsens or does not improve in 7 days follow up with pcp.  If pt develops worsening fever, coughing, chest pain, chest congestion and shortness of breath go to ER.

## 2020-03-02 NOTE — PROGRESS NOTES
Subjective   Kym Pepe is a 35 y.o. female who presents to the clinic with: fever      Onset yesterday with fever 101.2 and cough, headache, and runny nose.      Fever    This is a new problem. The current episode started yesterday. The problem occurs constantly. The problem has been gradually worsening. The maximum temperature noted was 101 to 101.9 F. The temperature was taken using an oral thermometer. Associated symptoms include chest pain, congestion, coughing, headaches, muscle aches and nausea. Pertinent negatives include no sore throat, vomiting or wheezing. She has tried acetaminophen and NSAIDs for the symptoms. The treatment provided no relief.   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        Review of Systems   Constitutional: Positive for chills, diaphoresis and fever.   HENT: Positive for congestion and rhinorrhea. Negative for sore throat.    Respiratory: Positive for cough and shortness of breath. Negative for wheezing.    Cardiovascular: Positive for chest pain.   Gastrointestinal: Positive for nausea. Negative for vomiting.   Neurological: Positive for headaches.         Objective   Physical Exam   Constitutional: She is oriented to person, place, and time. Vital signs are normal. She appears well-developed and well-nourished.   HENT:   Head: Normocephalic and atraumatic.   Right Ear: Hearing, external ear and ear canal normal.   Left Ear: Hearing, external ear and ear canal normal.   Nose: Mucosal edema and rhinorrhea present. Right sinus exhibits no maxillary sinus tenderness and no frontal sinus tenderness. Left sinus exhibits no maxillary sinus tenderness and no frontal sinus tenderness.   Mouth/Throat: Uvula is midline,  oropharynx is clear and moist and mucous membranes are normal. Tonsils are 2+ on the right. Tonsils are 2+ on the left. No tonsillar exudate.   Rick tms dull and cloudy with bulging around edges and retraction in center   Eyes: Pupils are equal, round, and reactive to light. Conjunctivae are normal.   Neck: Neck supple.   Cardiovascular: Normal rate, regular rhythm, S1 normal, S2 normal and normal heart sounds. Exam reveals no gallop, no S3, no S4 and no friction rub.   No murmur heard.  Pulmonary/Chest: Effort normal and breath sounds normal. No respiratory distress. She has no wheezes. She has no rales.   Lymphadenopathy:     She has no cervical adenopathy.   Neurological: She is alert and oriented to person, place, and time.   Skin: Skin is warm, dry and intact.   Psychiatric: She has a normal mood and affect. Her speech is normal and behavior is normal. Judgment and thought content normal.   Vitals reviewed.        Assessment/Plan   Kym was seen today for fever.    Diagnoses and all orders for this visit:    Fever, unspecified fever cause  -     POCT Influenza A/B    Flu-like symptoms    Other orders  -     Baloxavir Marboxil,40 MG Dose, (XOFLUZA) 2 x 20 MG tablet therapy pack; Take 1 tablet by mouth 1 (One) Time for 1 dose.          Pt advised flu is neg for a and b. I will not do strep since she does not have sore throat.  I suspect she has the flu but too early to show on test.m Pt requested zofluza.  I advised it is a new drug and side effects can show up later that they did not know about.  I usually do not prescribe new drugs because of this reason.  Pt states she will take her chances.  Instructed pt on zofluza purpose, dosage, frequency, and side effects.  Observe and treat the s/s with delsym or robitussin dm as needed for cough, zyrtec or claritin as needed for runny nose, tylenol or ibuprofen as needed for fever over 101 and pain.  If pt worsens or does not improve in 7 days follow up with pcp.  If  pt develops worsening fever, coughing, chest pain, chest congestion and shortness of breath go to ER.

## 2021-01-08 ENCOUNTER — APPOINTMENT (OUTPATIENT)
Dept: VACCINE CLINIC | Facility: HOSPITAL | Age: 36
End: 2021-01-08

## 2021-02-05 ENCOUNTER — APPOINTMENT (OUTPATIENT)
Dept: VACCINE CLINIC | Facility: HOSPITAL | Age: 36
End: 2021-02-05

## 2024-06-21 ENCOUNTER — TELEPHONE (OUTPATIENT)
Dept: PRIMARY CARE CLINIC | Age: 39
End: 2024-06-21

## 2024-06-21 DIAGNOSIS — Z80.3 FAMILY HISTORY OF BREAST CANCER IN FEMALE: ICD-10-CM

## 2024-06-21 DIAGNOSIS — N63.0 BREAST NODULE: Primary | ICD-10-CM

## 2024-06-21 NOTE — TELEPHONE ENCOUNTER
NP with [facility].  She found a small lump to the R breast yesterday.  She has a FHx breast cancer in [facility] and maternal aunt.  She has never had a mammogram.  I palpated a small BB sized  mobile nodule to the R lateral breast 9 o'clock.  Will order diagnostic mammo and US.

## 2024-07-03 ENCOUNTER — HOSPITAL ENCOUNTER (OUTPATIENT)
Dept: WOMENS IMAGING | Age: 39
Discharge: HOME | End: 2024-07-03
Payer: COMMERCIAL

## 2024-07-03 VITALS — WEIGHT: 120 LBS

## 2024-07-03 DIAGNOSIS — N63.0 BREAST NODULE: ICD-10-CM

## 2024-07-03 DIAGNOSIS — Z80.3 FAMILY HISTORY OF BREAST CANCER IN FEMALE: ICD-10-CM

## 2024-07-03 PROCEDURE — 76642 ULTRASOUND BREAST LIMITED: CPT

## 2024-07-03 PROCEDURE — G0279 TOMOSYNTHESIS, MAMMO: HCPCS

## 2024-07-09 DIAGNOSIS — Z91.89 AT INCREASED RISK OF BREAST CANCER: ICD-10-CM

## 2024-07-09 DIAGNOSIS — Z80.3 FAMILY HISTORY OF BREAST CANCER IN FEMALE: Primary | ICD-10-CM

## 2025-01-24 RX ORDER — METHYLPREDNISOLONE 4 MG/1
TABLET ORAL
Qty: 1 KIT | Refills: 0 | Status: SHIPPED | OUTPATIENT
Start: 2025-01-24 | End: 2025-01-30

## 2025-03-28 RX ORDER — METHYLPREDNISOLONE 4 MG/1
TABLET ORAL
Qty: 1 KIT | Refills: 0 | Status: SHIPPED | OUTPATIENT
Start: 2025-03-28 | End: 2025-04-03

## 2025-05-30 ENCOUNTER — TELEPHONE (OUTPATIENT)
Dept: PRIMARY CARE CLINIC | Age: 40
End: 2025-05-30

## 2025-05-30 DIAGNOSIS — J01.40 ACUTE NON-RECURRENT PANSINUSITIS: Primary | ICD-10-CM

## 2025-05-30 RX ORDER — AZITHROMYCIN 250 MG/1
TABLET, FILM COATED ORAL
Qty: 6 TABLET | Refills: 0 | Status: SHIPPED | OUTPATIENT
Start: 2025-05-30 | End: 2025-06-09

## 2025-05-30 RX ORDER — METHYLPREDNISOLONE 4 MG/1
TABLET ORAL
Qty: 1 KIT | Refills: 0 | Status: SHIPPED | OUTPATIENT
Start: 2025-05-30 | End: 2025-06-05